# Patient Record
Sex: FEMALE | Race: BLACK OR AFRICAN AMERICAN | NOT HISPANIC OR LATINO | URBAN - METROPOLITAN AREA
[De-identification: names, ages, dates, MRNs, and addresses within clinical notes are randomized per-mention and may not be internally consistent; named-entity substitution may affect disease eponyms.]

---

## 2018-11-28 ENCOUNTER — OFFICE (OUTPATIENT)
Dept: URBAN - METROPOLITAN AREA CLINIC 10 | Facility: CLINIC | Age: 73
End: 2018-11-28

## 2018-11-28 VITALS
HEART RATE: 71 BPM | HEIGHT: 64 IN | SYSTOLIC BLOOD PRESSURE: 115 MMHG | WEIGHT: 195 LBS | DIASTOLIC BLOOD PRESSURE: 75 MMHG

## 2018-11-28 DIAGNOSIS — R11.2 NAUSEA WITH VOMITING, UNSPECIFIED: ICD-10-CM

## 2018-11-28 DIAGNOSIS — K92.1 MELENA: ICD-10-CM

## 2018-11-28 DIAGNOSIS — R19.7 DIARRHEA, UNSPECIFIED: ICD-10-CM

## 2018-11-28 DIAGNOSIS — R10.84 GENERALIZED ABDOMINAL PAIN: ICD-10-CM

## 2018-11-28 LAB
CBC WITH DIFFERENTIAL/PLATELET: BASO (ABSOLUTE): 0 X10E3/UL (ref 0–0.2)
CBC WITH DIFFERENTIAL/PLATELET: BASOS: 0 %
CBC WITH DIFFERENTIAL/PLATELET: EOS (ABSOLUTE): 0 X10E3/UL (ref 0–0.4)
CBC WITH DIFFERENTIAL/PLATELET: EOS: 0 %
CBC WITH DIFFERENTIAL/PLATELET: HEMATOCRIT: 36.9 % (ref 34–46.6)
CBC WITH DIFFERENTIAL/PLATELET: HEMATOLOGY COMMENTS: (no result)
CBC WITH DIFFERENTIAL/PLATELET: HEMOGLOBIN: 12.4 G/DL (ref 11.1–15.9)
CBC WITH DIFFERENTIAL/PLATELET: IMMATURE CELLS: (no result)
CBC WITH DIFFERENTIAL/PLATELET: IMMATURE GRANS (ABS): 0 X10E3/UL (ref 0–0.1)
CBC WITH DIFFERENTIAL/PLATELET: IMMATURE GRANULOCYTES: 0 %
CBC WITH DIFFERENTIAL/PLATELET: LYMPHS (ABSOLUTE): 0.9 X10E3/UL (ref 0.7–3.1)
CBC WITH DIFFERENTIAL/PLATELET: LYMPHS: 7 %
CBC WITH DIFFERENTIAL/PLATELET: MCH: 29.1 PG (ref 26.6–33)
CBC WITH DIFFERENTIAL/PLATELET: MCHC: 33.6 G/DL (ref 31.5–35.7)
CBC WITH DIFFERENTIAL/PLATELET: MCV: 87 FL (ref 79–97)
CBC WITH DIFFERENTIAL/PLATELET: MONOCYTES(ABSOLUTE): 1.5 X10E3/UL — HIGH (ref 0.1–0.9)
CBC WITH DIFFERENTIAL/PLATELET: MONOCYTES: 12 %
CBC WITH DIFFERENTIAL/PLATELET: NEUTROPHILS (ABSOLUTE): 10.4 X10E3/UL — HIGH (ref 1.4–7)
CBC WITH DIFFERENTIAL/PLATELET: NEUTROPHILS: 81 %
CBC WITH DIFFERENTIAL/PLATELET: NRBC: (no result)
CBC WITH DIFFERENTIAL/PLATELET: PLATELETS: 210 X10E3/UL (ref 150–379)
CBC WITH DIFFERENTIAL/PLATELET: RBC: 4.26 X10E6/UL (ref 3.77–5.28)
CBC WITH DIFFERENTIAL/PLATELET: RDW: 14.4 % (ref 12.3–15.4)
CBC WITH DIFFERENTIAL/PLATELET: WBC: 12.9 X10E3/UL — HIGH (ref 3.4–10.8)
COMP. METABOLIC PANEL (14): A/G RATIO: 1.3 (ref 1.2–2.2)
COMP. METABOLIC PANEL (14): ALBUMIN: 3.9 G/DL (ref 3.5–4.8)
COMP. METABOLIC PANEL (14): ALKALINE PHOSPHATASE: 131 IU/L — HIGH (ref 39–117)
COMP. METABOLIC PANEL (14): ALT (SGPT): 18 IU/L (ref 0–32)
COMP. METABOLIC PANEL (14): AST (SGOT): 21 IU/L (ref 0–40)
COMP. METABOLIC PANEL (14): BILIRUBIN, TOTAL: 0.5 MG/DL (ref 0–1.2)
COMP. METABOLIC PANEL (14): BUN/CREATININE RATIO: 13 (ref 12–28)
COMP. METABOLIC PANEL (14): BUN: 9 MG/DL (ref 8–27)
COMP. METABOLIC PANEL (14): CALCIUM: 9.2 MG/DL (ref 8.7–10.3)
COMP. METABOLIC PANEL (14): CARBON DIOXIDE, TOTAL: 25 MMOL/L (ref 20–29)
COMP. METABOLIC PANEL (14): CHLORIDE: 96 MMOL/L (ref 96–106)
COMP. METABOLIC PANEL (14): CREATININE: 0.7 MG/DL (ref 0.57–1)
COMP. METABOLIC PANEL (14): EGFR IF AFRICN AM: 101 ML/MIN/1.73 (ref 59–?)
COMP. METABOLIC PANEL (14): EGFR IF NONAFRICN AM: 87 ML/MIN/1.73 (ref 59–?)
COMP. METABOLIC PANEL (14): GLOBULIN, TOTAL: 2.9 G/DL (ref 1.5–4.5)
COMP. METABOLIC PANEL (14): GLUCOSE: 98 MG/DL (ref 65–99)
COMP. METABOLIC PANEL (14): POTASSIUM: 3.9 MMOL/L (ref 3.5–5.2)
COMP. METABOLIC PANEL (14): PROTEIN, TOTAL: 6.8 G/DL (ref 6–8.5)
COMP. METABOLIC PANEL (14): SODIUM: 138 MMOL/L (ref 134–144)

## 2018-11-28 PROCEDURE — 99204 OFFICE O/P NEW MOD 45 MIN: CPT | Performed by: INTERNAL MEDICINE

## 2018-11-28 RX ORDER — PANTOPRAZOLE SODIUM 40 MG/1
TABLET, DELAYED RELEASE ORAL
Qty: 30 | Refills: 3 | Status: COMPLETED
Start: 2018-11-28 | End: 2019-04-16

## 2018-11-28 RX ORDER — ONDANSETRON 8 MG/1
TABLET, ORALLY DISINTEGRATING ORAL
Qty: 30 | Refills: 1 | Status: COMPLETED
Start: 2018-11-28 | End: 2018-12-13

## 2018-11-28 NOTE — SERVICEHPINOTES
Ms. Tristan is 72 years old. She is here in consultation with Dr. Nelson for chief complaint of rectal bleeding. She describes waking Thursday (Thanksgiving morning) with rectal bleeding followed by abdominal pain that is diffuse and burning in quality. She complains of diarrhea with a frequency of >6 times / day with much less blood. She complains of nausea and vomiting. She describes nausea with eating. The last thing she ate was a salad. No one else got sick. She took some Zofran today with improvement in emesis. She also describes increased belching. Review of records from [place] show a CT scan of abd/pelvis that was unremarkable.

## 2018-11-29 ENCOUNTER — OFFICE (OUTPATIENT)
Dept: URBAN - METROPOLITAN AREA CLINIC 10 | Facility: CLINIC | Age: 73
End: 2018-11-29

## 2018-11-29 LAB
GI PROFILE, STOOL, PCR: ADENOVIRUS F 40/41: NOT DETECTED
GI PROFILE, STOOL, PCR: ASTROVIRUS: NOT DETECTED
GI PROFILE, STOOL, PCR: C DIFFICILE TOXIN A/B: DETECTED
GI PROFILE, STOOL, PCR: CAMPYLOBACTER: NOT DETECTED
GI PROFILE, STOOL, PCR: CRYPTOSPORIDIUM: NOT DETECTED
GI PROFILE, STOOL, PCR: CYCLOSPORA CAYETANENSIS: NOT DETECTED
GI PROFILE, STOOL, PCR: E COLI O157: (no result)
GI PROFILE, STOOL, PCR: ENTAMOEBA HISTOLYTICA: NOT DETECTED
GI PROFILE, STOOL, PCR: ENTEROAGGREGATIVE E COLI: NOT DETECTED
GI PROFILE, STOOL, PCR: ENTEROPATHOGENIC E COLI: NOT DETECTED
GI PROFILE, STOOL, PCR: ENTEROTOXIGENIC E COLI: NOT DETECTED
GI PROFILE, STOOL, PCR: GIARDIA LAMBLIA: NOT DETECTED
GI PROFILE, STOOL, PCR: NOROVIRUS GI/GII: NOT DETECTED
GI PROFILE, STOOL, PCR: PLESIOMONAS SHIGELLOIDES: NOT DETECTED
GI PROFILE, STOOL, PCR: ROTAVIRUS A: NOT DETECTED
GI PROFILE, STOOL, PCR: SALMONELLA: NOT DETECTED
GI PROFILE, STOOL, PCR: SAPOVIRUS: NOT DETECTED
GI PROFILE, STOOL, PCR: SHIGA-TOXIN-PRODUCING E COLI: NOT DETECTED
GI PROFILE, STOOL, PCR: SHIGELLA/ENTEROINVASIVE E COLI: NOT DETECTED
GI PROFILE, STOOL, PCR: VIBRIO CHOLERAE: NOT DETECTED
GI PROFILE, STOOL, PCR: VIBRIO: NOT DETECTED
GI PROFILE, STOOL, PCR: YERSINIA ENTEROCOLITICA: NOT DETECTED

## 2018-12-13 ENCOUNTER — OFFICE (OUTPATIENT)
Dept: URBAN - METROPOLITAN AREA CLINIC 10 | Facility: CLINIC | Age: 73
End: 2018-12-13

## 2018-12-13 VITALS
DIASTOLIC BLOOD PRESSURE: 79 MMHG | WEIGHT: 187 LBS | HEART RATE: 78 BPM | SYSTOLIC BLOOD PRESSURE: 123 MMHG | HEIGHT: 64 IN

## 2018-12-13 DIAGNOSIS — A04.72 ENTEROCOLITIS DUE TO CLOSTRIDIUM DIFFICILE, NOT SPECIFIED AS: ICD-10-CM

## 2018-12-13 PROCEDURE — 99213 OFFICE O/P EST LOW 20 MIN: CPT | Performed by: INTERNAL MEDICINE

## 2018-12-13 RX ORDER — VANCOMYCIN HYDROCHLORIDE 125 MG/1
CAPSULE ORAL
Qty: 56 | Refills: 0 | Status: COMPLETED
Start: 2018-12-13 | End: 2019-04-16

## 2019-04-16 ENCOUNTER — OFFICE (OUTPATIENT)
Dept: URBAN - METROPOLITAN AREA CLINIC 10 | Facility: CLINIC | Age: 74
End: 2019-04-16

## 2019-04-16 VITALS
HEIGHT: 64 IN | HEART RATE: 61 BPM | WEIGHT: 181 LBS | SYSTOLIC BLOOD PRESSURE: 155 MMHG | DIASTOLIC BLOOD PRESSURE: 83 MMHG

## 2019-04-16 DIAGNOSIS — A04.72 ENTEROCOLITIS DUE TO CLOSTRIDIUM DIFFICILE, NOT SPECIFIED AS: ICD-10-CM

## 2019-04-16 PROCEDURE — 99213 OFFICE O/P EST LOW 20 MIN: CPT | Performed by: INTERNAL MEDICINE

## 2019-04-16 RX ORDER — SODIUM SULFATE, POTASSIUM SULFATE, MAGNESIUM SULFATE 17.5; 3.13; 1.6 G/ML; G/ML; G/ML
SOLUTION, CONCENTRATE ORAL
Qty: 1 | Refills: 0 | Status: COMPLETED
Start: 2019-04-16 | End: 2019-05-22

## 2019-05-22 ENCOUNTER — AMBULATORY SURGICAL CENTER (OUTPATIENT)
Dept: URBAN - METROPOLITAN AREA SURGERY 1 | Facility: SURGERY | Age: 74
End: 2019-05-22
Payer: MEDICARE

## 2019-05-22 ENCOUNTER — OFFICE (OUTPATIENT)
Dept: URBAN - METROPOLITAN AREA PATHOLOGY 22 | Facility: PATHOLOGY | Age: 74
End: 2019-05-22
Payer: MEDICARE

## 2019-05-22 VITALS
HEIGHT: 64 IN | WEIGHT: 176 LBS | SYSTOLIC BLOOD PRESSURE: 124 MMHG | TEMPERATURE: 98 F | SYSTOLIC BLOOD PRESSURE: 109 MMHG | SYSTOLIC BLOOD PRESSURE: 124 MMHG | SYSTOLIC BLOOD PRESSURE: 148 MMHG | HEART RATE: 74 BPM | SYSTOLIC BLOOD PRESSURE: 109 MMHG | SYSTOLIC BLOOD PRESSURE: 112 MMHG | HEART RATE: 74 BPM | HEART RATE: 72 BPM | TEMPERATURE: 97.8 F | RESPIRATION RATE: 14 BRPM | DIASTOLIC BLOOD PRESSURE: 88 MMHG | SYSTOLIC BLOOD PRESSURE: 124 MMHG | TEMPERATURE: 98 F | SYSTOLIC BLOOD PRESSURE: 110 MMHG | OXYGEN SATURATION: 94 % | RESPIRATION RATE: 16 BRPM | SYSTOLIC BLOOD PRESSURE: 148 MMHG | TEMPERATURE: 97.8 F | DIASTOLIC BLOOD PRESSURE: 88 MMHG | DIASTOLIC BLOOD PRESSURE: 73 MMHG | HEART RATE: 75 BPM | DIASTOLIC BLOOD PRESSURE: 73 MMHG | SYSTOLIC BLOOD PRESSURE: 148 MMHG | WEIGHT: 176 LBS | HEART RATE: 74 BPM | DIASTOLIC BLOOD PRESSURE: 58 MMHG | DIASTOLIC BLOOD PRESSURE: 73 MMHG | HEART RATE: 73 BPM | OXYGEN SATURATION: 94 % | DIASTOLIC BLOOD PRESSURE: 58 MMHG | RESPIRATION RATE: 16 BRPM | OXYGEN SATURATION: 96 % | TEMPERATURE: 98 F | DIASTOLIC BLOOD PRESSURE: 71 MMHG | WEIGHT: 176 LBS | HEART RATE: 73 BPM | TEMPERATURE: 97.8 F | HEIGHT: 64 IN | OXYGEN SATURATION: 96 % | RESPIRATION RATE: 16 BRPM | SYSTOLIC BLOOD PRESSURE: 110 MMHG | SYSTOLIC BLOOD PRESSURE: 110 MMHG | DIASTOLIC BLOOD PRESSURE: 88 MMHG | DIASTOLIC BLOOD PRESSURE: 71 MMHG | OXYGEN SATURATION: 97 % | HEART RATE: 73 BPM | OXYGEN SATURATION: 94 % | OXYGEN SATURATION: 97 % | HEART RATE: 72 BPM | RESPIRATION RATE: 14 BRPM | HEIGHT: 64 IN | OXYGEN SATURATION: 96 % | SYSTOLIC BLOOD PRESSURE: 112 MMHG | SYSTOLIC BLOOD PRESSURE: 109 MMHG | HEART RATE: 72 BPM | DIASTOLIC BLOOD PRESSURE: 76 MMHG | SYSTOLIC BLOOD PRESSURE: 112 MMHG | RESPIRATION RATE: 14 BRPM | DIASTOLIC BLOOD PRESSURE: 76 MMHG | DIASTOLIC BLOOD PRESSURE: 76 MMHG | DIASTOLIC BLOOD PRESSURE: 71 MMHG | DIASTOLIC BLOOD PRESSURE: 58 MMHG | HEART RATE: 75 BPM | HEART RATE: 75 BPM | OXYGEN SATURATION: 97 %

## 2019-05-22 DIAGNOSIS — D12.5 BENIGN NEOPLASM OF SIGMOID COLON: ICD-10-CM

## 2019-05-22 DIAGNOSIS — D12.3 BENIGN NEOPLASM OF TRANSVERSE COLON: ICD-10-CM

## 2019-05-22 DIAGNOSIS — K57.30 DIVERTICULOSIS OF LARGE INTESTINE WITHOUT PERFORATION OR ABS: ICD-10-CM

## 2019-05-22 DIAGNOSIS — Z80.0 FAMILY HISTORY OF MALIGNANT NEOPLASM OF DIGESTIVE ORGANS: ICD-10-CM

## 2019-05-22 DIAGNOSIS — K51.40 INFLAMMATORY POLYPS OF COLON WITHOUT COMPLICATIONS: ICD-10-CM

## 2019-05-22 DIAGNOSIS — K52.9 NONINFECTIVE GASTROENTERITIS AND COLITIS, UNSPECIFIED: ICD-10-CM

## 2019-05-22 PROBLEM — D12.0 BENIGN NEOPLASM OF CECUM: Status: ACTIVE | Noted: 2019-05-22

## 2019-05-22 PROCEDURE — 45380 COLONOSCOPY AND BIOPSY: CPT | Mod: 59,PT | Performed by: INTERNAL MEDICINE

## 2019-05-22 PROCEDURE — 88341 IMHCHEM/IMCYTCHM EA ADD ANTB: CPT | Performed by: INTERNAL MEDICINE

## 2019-05-22 PROCEDURE — 88305 TISSUE EXAM BY PATHOLOGIST: CPT | Performed by: INTERNAL MEDICINE

## 2019-05-22 PROCEDURE — 45380 COLONOSCOPY AND BIOPSY: CPT | Mod: PT,59 | Performed by: INTERNAL MEDICINE

## 2019-05-22 PROCEDURE — 45385 COLONOSCOPY W/LESION REMOVAL: CPT | Mod: PT | Performed by: INTERNAL MEDICINE

## 2019-05-22 PROCEDURE — 88342 IMHCHEM/IMCYTCHM 1ST ANTB: CPT | Performed by: INTERNAL MEDICINE

## 2019-05-22 NOTE — SERVICEHPINOTES
73-year-old with history of colon cancer in her father at age greater than 70. She does have a brother that was diagnosed with colon cancer in his 50s.

## 2019-11-26 ENCOUNTER — OFFICE (OUTPATIENT)
Dept: URBAN - METROPOLITAN AREA CLINIC 10 | Facility: CLINIC | Age: 74
End: 2019-11-26

## 2019-11-26 VITALS
SYSTOLIC BLOOD PRESSURE: 129 MMHG | HEART RATE: 73 BPM | HEIGHT: 64 IN | DIASTOLIC BLOOD PRESSURE: 79 MMHG | WEIGHT: 180 LBS

## 2019-11-26 DIAGNOSIS — K52.9 NONINFECTIVE GASTROENTERITIS AND COLITIS, UNSPECIFIED: ICD-10-CM

## 2019-11-26 PROCEDURE — 99212 OFFICE O/P EST SF 10 MIN: CPT | Performed by: INTERNAL MEDICINE
